# Patient Record
Sex: FEMALE | Race: OTHER | NOT HISPANIC OR LATINO
[De-identification: names, ages, dates, MRNs, and addresses within clinical notes are randomized per-mention and may not be internally consistent; named-entity substitution may affect disease eponyms.]

---
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
## 2022-02-02 ENCOUNTER — APPOINTMENT (OUTPATIENT)
Dept: ENDOCRINOLOGY | Facility: CLINIC | Age: 22
End: 2022-02-02
Payer: COMMERCIAL

## 2022-02-02 VITALS
WEIGHT: 264 LBS | HEIGHT: 66 IN | DIASTOLIC BLOOD PRESSURE: 80 MMHG | BODY MASS INDEX: 42.43 KG/M2 | SYSTOLIC BLOOD PRESSURE: 120 MMHG | TEMPERATURE: 98.6 F | HEART RATE: 65 BPM | OXYGEN SATURATION: 97 %

## 2022-02-02 DIAGNOSIS — R74.01 ELEVATION OF LEVELS OF LIVER TRANSAMINASE LEVELS: ICD-10-CM

## 2022-02-02 DIAGNOSIS — E66.9 OBESITY, UNSPECIFIED: ICD-10-CM

## 2022-02-02 PROCEDURE — 99215 OFFICE O/P EST HI 40 MIN: CPT

## 2022-02-02 RX ORDER — PEN NEEDLE, DIABETIC 29 G X1/2"
32G X 4 MM NEEDLE, DISPOSABLE MISCELLANEOUS
Qty: 100 | Refills: 3 | Status: DISCONTINUED | COMMUNITY
Start: 2021-06-08 | End: 2022-02-02

## 2022-02-02 NOTE — REASON FOR VISIT
[Follow - Up] : a follow-up visit [Weight Management/Obesity] : weight management/obesity [PCOS] : PCOS [Hypothyroidism] : hypothyroidism

## 2022-02-02 NOTE — DATA REVIEWED
[FreeTextEntry1] : Labs\par 12/2/2020\par serum sodium 131\par BUN/cr 5/0.7\par calcium 9.3\par AST 54 ALT 69\par chol 204 tri 228 HDL 31 \par H/H 13.6/40.7\par Hba1c 5.5%\par testo 67 SHBG 8\par insulin level 67.1\par HCG <1\par DHEAS 125\par 24 urine for free cortisol 20.7\par TSH 3.7 \par \par 8/5/21\par glucose 73\par BUN/cr 5/0.66\par calcium 9.4\par AST 53 ALT 95\par chol 226 tri 268 HDL 29 \par H/H 13.8/42.5\par Hba1c 5.7%\par TSH 5.34 Free T4 0.97\par testosterone 50.2\par DHEAS 100\par insulin level 69.3\par \par 1/15/22\par BUN/cr 13/.65\par calcium 9.7\par glucose 84\par AST 30 ALT 63\par chol 184 HDL 32  tri 218\par WBC 11.10\par Hba1c 5.6%\par H/H 13.2/39.7\par TSH 2.9\par Free T4 1.01\par testosterone 32.1\par Tg ab < 20\par DHEA 2.6\par insulin > 300\par

## 2022-02-02 NOTE — HISTORY OF PRESENT ILLNESS
[FreeTextEntry1] : 21 y.o. female with h/o PCOS diagnosed in 2016, obesity and hypothyroidism here for follow up visit. \par \par Originally saw pediatric endocrinology in February 2016 for primary amenorrhea. Laboratory studies showed normal adrenal androgens and high testosterone with low SHBG consistent with PCOS. Patient was treated with courses of Provera and attempted lifestyle changes. Patient was not able to lose weight and started Metformin in February 2017. In October 2019, Metformin was increased to 1,000 mg BID. There was a discussion on weight loss agents such as Saxenda and Orilstat and also bariatric surgery. Patient has declined weight loss medications and bariatric surgery. Patient also was seen by pediatric GI for fatty liver disease. Had fibroscan with significant steatosis but no fibrosis. \par \par Not sexually active and has never seen GYN. Does report h/o acne but now stable. Did see dermatology in the past but not recently. Reports increased facial hair more so under the chin but stable. Reports shaving every 2 weeks. Reports more rapid weight gain in her middle school years. She reports weight gain of 20 pounds since May 2020; however, weight is stable since her last visit.  Has been using Provera every 3 months or so and satisfied with this. Not interested in OCPs because of compliance issue. Taking Metformin ER 2,000 mg daily and more consistent. Never tried Ozempic because decided she did not want any additional medication right now. \par \par Diet: Met with dietician a long time ago\par For breakfast: skips\par For lunch: frozen pizza or chicken nuggets\par For dinner: increased vegetable intake, protein and pasta \par For snacks: ice pops\par Drinks: water, sometimes sweetened iced tea but less and no soda\par \par No exercise. Doing some walking now. She is completed her freshman at [employer] and now transferred to [employer]. Also works at a local bakery 3 days per week. \par \par In regards to hypothyroidism diagnosed in 2021, taking LT4 50 mcg daily and compliant. No new complaints. \par \par In regards to hyperlipidemia, taking Omega 3 fish oil 1 daily.

## 2022-02-02 NOTE — PHYSICAL EXAM
[Alert] : alert [Obese] : obese [No Acute Distress] : no acute distress [Normal Sclera/Conjunctiva] : normal sclera/conjunctiva [EOMI] : extra ocular movement intact [No LAD] : no lymphadenopathy [Thyroid Not Enlarged] : the thyroid was not enlarged [No Thyroid Nodules] : no palpable thyroid nodules [No Respiratory Distress] : no respiratory distress [Clear to Auscultation] : lungs were clear to auscultation bilaterally [Normal S1, S2] : normal S1 and S2 [Regular Rhythm] : with a regular rhythm [No Edema] : no peripheral edema [Normal Bowel Sounds] : normal bowel sounds [Not Tender] : non-tender [Not Distended] : not distended [Soft] : abdomen soft [Normal Anterior Cervical Nodes] : no anterior cervical lymphadenopathy [No Clubbing, Cyanosis] : no clubbing  or cyanosis of the fingernails [No Rash] : no rash [Abdominal Striae] : abdominal striae present [Acanthosis Nigricans] : acanthosis nigricans present [Hirsutism] : hirsutism present [Normal Reflexes] : deep tendon reflexes were 2+ and symmetric [Normal Mood] : the mood was normal [Kyphosis] : no kyphosis present [Acne] : no acne [de-identified] : dorsocervical fat pad noted [de-identified] : flat affect

## 2022-02-02 NOTE — REVIEW OF SYSTEMS
[Fatigue] : fatigue [Irregular Menses] : irregular menses [Acne] : acne [Hirsutism] : hirsutism [Hair Loss] : hair loss [Headaches] : headaches [Depression] : depression [Negative] : Musculoskeletal [Recent Weight Gain (___ Lbs)] : no recent weight gain [Recent Weight Loss (___ Lbs)] : no recent weight loss [Polyuria] : no polyuria [Polydipsia] : no polydipsia [Cold Intolerance] : no cold intolerance [Heat Intolerance] : no heat intolerance [Easy Bruising] : no tendency for easy bruising [Swelling] : no swelling [FreeTextEntry3] : wears glasses for distance

## 2022-02-02 NOTE — ASSESSMENT
[FreeTextEntry1] : 21 y.o. female with h/o PCOS, hyperandrogenism, hyperinsulinism, obesity, hypothyroidism and hyperlipidemia.\par \par 1. PCOS with hyperinsulinism- Discussed pathophysiology and concept of hyperinsulinism. Reviewed increased risk of developing Type 2 DM and now Hba1c is 5.6%. Encouraged lifestyle modification including carbohydrate consistent diet and exercise. Will continue Metformin ER 2,000 mg daily for better compliance. Recommend GYN evaluation now that patient is > 18 years old. Discussed options of OCPs and IUD. For now, will continue course of Provera every 3 months or so. \par \par 2. Hyperandrogenism with hirsutism- Discussed treatment options including Spironolactone and/or OCPs in the past. Patient prefers to monitor for now and does not feel additional treatment is warranted at this time.\par \par 3. Obesity- Screen for Cushings was negative given normal 24 hour urine for cortisol. For now, encouraged diet changes with portion control and exercise. Discussed options of GLP-1 analogs. Reviewed risks and benefits. Also discussed bariatric surgery. Patient prefers to work on lifestyle changes. Discussed the increased risk of developing HTN, Type 2 DM and FOOTE associated with obesity. \par \par 4. Hypothyroidism- Discussed pathophysiology of subclinical hypothyroidism. Given patient's young age, would recommend keeping TSH < 3.0. Will continue Levothyroxine 50 mcg daily. Discussed proper intake of T4. \par \par 5. Hyperlipidemia- Encouraged a low fat diet and exercise. Recommend increasing Omega 3 Fish oil to 2 capsules daily. \par \par Follow up in 4 to 6 months

## 2022-04-11 PROBLEM — Z11.59 SCREENING FOR VIRAL DISEASE: Status: ACTIVE | Noted: 2021-02-10

## 2022-04-18 ENCOUNTER — RX RENEWAL (OUTPATIENT)
Age: 22
End: 2022-04-18

## 2022-07-07 RX ORDER — ALBUTEROL SULFATE 90 UG/1
108 (90 BASE) INHALANT RESPIRATORY (INHALATION)
Qty: 1 | Refills: 0 | Status: ACTIVE | COMMUNITY
Start: 2021-01-04 | End: 1900-01-01

## 2022-08-13 ENCOUNTER — NON-APPOINTMENT (OUTPATIENT)
Age: 22
End: 2022-08-13

## 2022-08-17 ENCOUNTER — APPOINTMENT (OUTPATIENT)
Dept: ENDOCRINOLOGY | Facility: CLINIC | Age: 22
End: 2022-08-17

## 2022-08-17 VITALS
HEIGHT: 66 IN | OXYGEN SATURATION: 97 % | DIASTOLIC BLOOD PRESSURE: 74 MMHG | WEIGHT: 278 LBS | HEART RATE: 80 BPM | BODY MASS INDEX: 44.68 KG/M2 | SYSTOLIC BLOOD PRESSURE: 118 MMHG | TEMPERATURE: 97.6 F

## 2022-08-17 DIAGNOSIS — E16.1 OTHER HYPOGLYCEMIA: ICD-10-CM

## 2022-08-17 PROCEDURE — 99215 OFFICE O/P EST HI 40 MIN: CPT

## 2022-08-17 NOTE — HISTORY OF PRESENT ILLNESS
[FreeTextEntry1] : 22 y.o. female with h/o PCOS diagnosed in 2016, obesity and hypothyroidism here for follow up visit. \par \par Originally saw pediatric endocrinology in February 2016 for primary amenorrhea. Laboratory studies showed normal adrenal androgens and high testosterone with low SHBG consistent with PCOS. Patient was treated with courses of Provera and attempted lifestyle changes. Patient was not able to lose weight and started Metformin in February 2017. In October 2019, Metformin was increased to 1,000 mg BID. There was a discussion on weight loss agents such as Saxenda and Orilstat and also bariatric surgery. Patient has declined weight loss medications and bariatric surgery. Patient also was seen by pediatric GI for fatty liver disease. Had fibroscan with significant steatosis but no fibrosis. \par \par Not sexually active and has never seen GYN. Does report h/o acne but now stable. Did see dermatology in the past but not recently. Reports increased facial hair more so under the chin but stable. Reports shaving every 2 weeks. Reports more rapid weight gain in her middle school years. She reports weight gain of 20 pounds since May 2020; however, she reports more weight gain since her last visit.  Has been using Provera every 3 months or so and satisfied with this. Not interested in OCPs because of compliance issue. Taking Metformin ER 2,000 mg daily but not very consistent and only taking once weekly. Never tried Ozempic because decided she did not want any additional medication right now. \par \par Diet: Met with dietician a long time ago\par For breakfast: skips\par For lunch: frozen pizza or chicken nuggets\par For dinner: less vegetable intake, protein and pasta \par For snacks: ice pops\par Drinks: water\par \par No exercise. Doing some walking and swimming now. She is completed her freshman year at [employer] and now transferred to [employer] (will start carl year). Also works at a local bakery 3 days per week. \par \par In regards to hypothyroidism diagnosed in 2021, taking LT4 50 mcg daily but noncompliant. No new complaints. \par \par In regards to hyperlipidemia, taking Omega 3 fish oil 2 daily but not consistent.

## 2022-08-17 NOTE — DATA REVIEWED
[FreeTextEntry1] : Labs\par 12/2/2020\par serum sodium 131\par BUN/cr 5/0.7\par calcium 9.3\par AST 54 ALT 69\par chol 204 tri 228 HDL 31 \par H/H 13.6/40.7\par Hba1c 5.5%\par testo 67 SHBG 8\par insulin level 67.1\par HCG <1\par DHEAS 125\par 24 urine for free cortisol 20.7\par TSH 3.7 \par \par 8/5/21\par glucose 73\par BUN/cr 5/0.66\par calcium 9.4\par AST 53 ALT 95\par chol 226 tri 268 HDL 29 \par H/H 13.8/42.5\par Hba1c 5.7%\par TSH 5.34 Free T4 0.97\par testosterone 50.2\par DHEAS 100\par insulin level 69.3\par \par 1/15/22\par BUN/cr 13/.65\par calcium 9.7\par glucose 84\par AST 30 ALT 63\par chol 184 HDL 32  tri 218\par WBC 11.10\par Hba1c 5.6%\par H/H 13.2/39.7\par TSH 2.9\par Free T4 1.01\par testosterone 32.1\par Tg ab < 20\par DHEA 2.6\par insulin > 300\par \par \par 8/16/22\par ALT 49\par glucose 74\par BUN/cr 9/0.56\par chol 170 tri 225 HDL 30 LDL 93\par H/H 12.8/38.2\par Hba1c 5.6%\par TSH 2.53\par Free T4 0.9\par LH 3.0 FSH 1.0\par prolactin 20.1\par estradiol 42\par folic acid 3.64 vitamin B12 319\par IGF-1 144\par testosterone 68

## 2022-08-17 NOTE — PHYSICAL EXAM
[Alert] : alert [Obese] : obese [No Acute Distress] : no acute distress [Normal Sclera/Conjunctiva] : normal sclera/conjunctiva [EOMI] : extra ocular movement intact [No LAD] : no lymphadenopathy [Thyroid Not Enlarged] : the thyroid was not enlarged [No Thyroid Nodules] : no palpable thyroid nodules [No Respiratory Distress] : no respiratory distress [Clear to Auscultation] : lungs were clear to auscultation bilaterally [Normal S1, S2] : normal S1 and S2 [Regular Rhythm] : with a regular rhythm [No Edema] : no peripheral edema [Normal Bowel Sounds] : normal bowel sounds [Not Tender] : non-tender [Not Distended] : not distended [Soft] : abdomen soft [Normal Anterior Cervical Nodes] : no anterior cervical lymphadenopathy [No Clubbing, Cyanosis] : no clubbing  or cyanosis of the fingernails [No Rash] : no rash [Abdominal Striae] : abdominal striae present [Acanthosis Nigricans] : acanthosis nigricans present [Hirsutism] : hirsutism present [Normal Reflexes] : deep tendon reflexes were 2+ and symmetric [Normal Mood] : the mood was normal [Kyphosis] : no kyphosis present [Acne] : no acne [de-identified] : dorsocervical fat pad noted [de-identified] : flat affect

## 2022-08-17 NOTE — REVIEW OF SYSTEMS
[Fatigue] : fatigue [Recent Weight Gain (___ Lbs)] : recent weight gain: [unfilled] lbs [Irregular Menses] : irregular menses [Acne] : acne [Hirsutism] : hirsutism [Hair Loss] : hair loss [Headaches] : headaches [Depression] : depression [Negative] : Musculoskeletal [Recent Weight Loss (___ Lbs)] : no recent weight loss [Polyuria] : no polyuria [Polydipsia] : no polydipsia [Cold Intolerance] : no cold intolerance [Heat Intolerance] : no heat intolerance [Easy Bruising] : no tendency for easy bruising [Swelling] : no swelling [FreeTextEntry3] : wears glasses for distance

## 2022-08-17 NOTE — ASSESSMENT
[FreeTextEntry1] : 22 y.o. female with h/o PCOS, hyperandrogenism, hyperinsulinism, obesity, hypothyroidism and hyperlipidemia.\par \par 1. PCOS with hyperinsulinism- Discussed pathophysiology and concept of hyperinsulinism. Reviewed increased risk of developing Type 2 DM and now Hba1c is 5.6%. Encouraged lifestyle modification including a carbohydrate consistent diet and exercise. Will continue Metformin ER 2,000 mg daily for better compliance and recommend taking it daily. Recommend GYN evaluation now that patient is > 18 years old. Discussed options of OCPs and IUD. For now, will continue course of Provera every 3 months or so. \par \par 2. Hyperandrogenism with hirsutism- Discussed treatment options including Spironolactone and/or OCPs in the past. Patient prefers to monitor for now and does not feel additional treatment is warranted at this time.\par \par 3. Obesity- Screen for Cushings was negative given normal 24 hour urine for cortisol. For now, encouraged diet changes with portion control and exercise. Discussed options of GLP-1 analogs. Reviewed risks and benefits. Also discussed bariatric surgery. Patient prefers to work on lifestyle changes. Discussed the increased risk of developing HTN, Type 2 DM and FOOTE associated with obesity. \par \par 4. Hypothyroidism- Discussed pathophysiology of subclinical hypothyroidism. Given patient's young age, would recommend keeping TSH < 3.0. Will continue Levothyroxine 50 mcg daily. Discussed proper intake of T4. \par \par 5. Hyperlipidemia- Encouraged a low fat diet and exercise. Recommend increasing Omega 3 Fish oil to 2 capsules daily. \par \par 6. Recommend adding Folic acid 400 mg daily. \par \par Follow up in 4 to 6 months

## 2022-09-19 ENCOUNTER — APPOINTMENT (OUTPATIENT)
Dept: FAMILY MEDICINE | Facility: CLINIC | Age: 22
End: 2022-09-19

## 2022-09-19 VITALS
WEIGHT: 280.6 LBS | RESPIRATION RATE: 16 BRPM | HEIGHT: 66 IN | DIASTOLIC BLOOD PRESSURE: 68 MMHG | HEART RATE: 99 BPM | OXYGEN SATURATION: 97 % | SYSTOLIC BLOOD PRESSURE: 102 MMHG | BODY MASS INDEX: 45.1 KG/M2 | TEMPERATURE: 98.4 F

## 2022-09-19 DIAGNOSIS — K76.0 FATTY (CHANGE OF) LIVER, NOT ELSEWHERE CLASSIFIED: ICD-10-CM

## 2022-09-19 DIAGNOSIS — Z23 ENCOUNTER FOR IMMUNIZATION: ICD-10-CM

## 2022-09-19 DIAGNOSIS — E66.01 MORBID (SEVERE) OBESITY DUE TO EXCESS CALORIES: ICD-10-CM

## 2022-09-19 DIAGNOSIS — Z00.00 ENCOUNTER FOR GENERAL ADULT MEDICAL EXAMINATION W/OUT ABNORMAL FINDINGS: ICD-10-CM

## 2022-09-19 PROCEDURE — 90686 IIV4 VACC NO PRSV 0.5 ML IM: CPT

## 2022-09-19 PROCEDURE — G0008: CPT

## 2022-09-19 PROCEDURE — 99395 PREV VISIT EST AGE 18-39: CPT | Mod: 25

## 2022-09-19 PROCEDURE — G0447 BEHAVIOR COUNSEL OBESITY 15M: CPT

## 2022-09-19 RX ORDER — LISDEXAMFETAMINE DIMESYLATE 40 MG/1
40 CAPSULE ORAL
Qty: 30 | Refills: 0 | Status: COMPLETED | COMMUNITY
End: 2022-09-19

## 2022-09-19 RX ORDER — SERTRALINE HYDROCHLORIDE 100 MG/1
100 TABLET, FILM COATED ORAL
Qty: 90 | Refills: 1 | Status: COMPLETED | COMMUNITY
Start: 2021-08-09 | End: 2022-09-19

## 2022-09-19 NOTE — HISTORY OF PRESENT ILLNESS
[de-identified] : 23 y/o F with multiple medical issues, HLD, Obesity,Hypothyroid, Hyperandrogenism, NAFLD , PCOS  presents for routine PE. Not sexually active. HAs 1  Covid shot 1 Booster/.

## 2023-01-16 ENCOUNTER — APPOINTMENT (OUTPATIENT)
Dept: FAMILY MEDICINE | Facility: CLINIC | Age: 23
End: 2023-01-16
Payer: COMMERCIAL

## 2023-01-16 DIAGNOSIS — J02.9 ACUTE PHARYNGITIS, UNSPECIFIED: ICD-10-CM

## 2023-01-16 DIAGNOSIS — R09.81 NASAL CONGESTION: ICD-10-CM

## 2023-01-16 PROCEDURE — 99214 OFFICE O/P EST MOD 30 MIN: CPT | Mod: 95

## 2023-01-16 NOTE — REVIEW OF SYSTEMS
[Fatigue] : fatigue [Nasal Discharge] : nasal discharge [Sore Throat] : sore throat [Postnasal Drip] : postnasal drip [Cough] : cough [Muscle Pain] : muscle pain [Negative] : Cardiovascular

## 2023-01-18 ENCOUNTER — APPOINTMENT (OUTPATIENT)
Dept: FAMILY MEDICINE | Facility: CLINIC | Age: 23
End: 2023-01-18
Payer: COMMERCIAL

## 2023-01-18 ENCOUNTER — NON-APPOINTMENT (OUTPATIENT)
Age: 23
End: 2023-01-18

## 2023-01-18 ENCOUNTER — APPOINTMENT (OUTPATIENT)
Dept: FAMILY MEDICINE | Facility: CLINIC | Age: 23
End: 2023-01-18

## 2023-01-18 DIAGNOSIS — U07.1 COVID-19: ICD-10-CM

## 2023-01-18 PROCEDURE — 99442: CPT

## 2023-01-18 NOTE — REVIEW OF SYSTEMS
[Fatigue] : fatigue [Cough] : cough [Negative] : Cardiovascular [Shortness Of Breath] : no shortness of breath [Wheezing] : no wheezing [Dyspnea on Exertion] : no dyspnea on exertion

## 2023-01-18 NOTE — HISTORY OF PRESENT ILLNESS
[Home] : at home, [unfilled] , at the time of the visit. [Medical Office: ([facility])___] : at the medical office located in  [Mother] : mother [FreeTextEntry3] : Mother [FreeTextEntry4] : Lenin Mcduffie [FreeTextEntry1] : 21 yo female with PMHx asthma (albuterol) presents for a follow up on COVID. [de-identified] : The patient's mother is present on behalf of the patient (patient is speaking to her mother during the visit, but does not want to speak on the phone) mother states that her daughter "swallowed a lot of mucous last night and wants to make sure it's not stuck in her chest" states that patient reports "my breathing is fine, I'm tired" denies any chest pain, shortness of breath. using ventolin prn, was given OTC mucinex last night which helped symptoms.

## 2023-02-08 ENCOUNTER — APPOINTMENT (OUTPATIENT)
Dept: ENDOCRINOLOGY | Facility: CLINIC | Age: 23
End: 2023-02-08
Payer: COMMERCIAL

## 2023-02-08 VITALS
OXYGEN SATURATION: 96 % | DIASTOLIC BLOOD PRESSURE: 90 MMHG | SYSTOLIC BLOOD PRESSURE: 140 MMHG | HEART RATE: 108 BPM | BODY MASS INDEX: 44.84 KG/M2 | HEIGHT: 66 IN | WEIGHT: 279 LBS

## 2023-02-08 PROCEDURE — 99215 OFFICE O/P EST HI 40 MIN: CPT

## 2023-02-08 NOTE — HISTORY OF PRESENT ILLNESS
[FreeTextEntry1] : 22 y.o. female with h/o PCOS diagnosed in 2016, obesity and hypothyroidism here for follow up visit. \par \par Originally saw pediatric endocrinology in February 2016 for primary amenorrhea. Laboratory studies showed normal adrenal androgens and high testosterone with low SHBG consistent with PCOS. Patient was treated with courses of Provera and attempted lifestyle changes. Patient was not able to lose weight and started Metformin in February 2017. In October 2019, Metformin was increased to 1,000 mg BID. There was a discussion on weight loss agents such as Saxenda and Orlistat and also bariatric surgery. Patient has declined weight loss medications and bariatric surgery. Patient also was seen by pediatric GI for fatty liver disease. Had fibroscan with significant steatosis but no fibrosis. \par \par Not sexually active and has never seen GYN. Does report h/o acne but now stable. Did see dermatology in the past but not recently. Reports increased facial hair more so under the chin but stable. Reports shaving every 2 weeks. Reports more rapid weight gain in her middle school years. She reports weight gain of 20 pounds since May 2020; however, she reports weight is stable since her last visit.  Has been using Provera every 3 months or so and satisfied with this. Not interested in OCPs because of compliance issue. Taking Metformin ER 2,000 mg daily but not very consistent. Never tried Ozempic because decided she did not want any additional medication right now. \par \par Diet: Met with dietician a long time ago\par For breakfast: skips\par For lunch: frozen pizza or chicken nuggets\par For dinner: some vegetable intake, protein and pasta \par For snacks: ice pops, bananas, chips or pretzels\par Drinks: water\par \par No exercise. Doing some walking. She is completed her freshman year at [employer] and now transferred to [employer]. Now taking a semester off.  Also works at a local bakery 3 days per week. \par \par In regards to hypothyroidism diagnosed in 2021, taking LT4 50 mcg daily but noncompliant. No new complaints. \par \par In regards to hyperlipidemia, taking Omega 3 fish oil 2 daily but not consistent.

## 2023-02-08 NOTE — PHYSICAL EXAM
[Alert] : alert [Obese] : obese [No Acute Distress] : no acute distress [Normal Sclera/Conjunctiva] : normal sclera/conjunctiva [EOMI] : extra ocular movement intact [No LAD] : no lymphadenopathy [Thyroid Not Enlarged] : the thyroid was not enlarged [No Thyroid Nodules] : no palpable thyroid nodules [No Respiratory Distress] : no respiratory distress [Clear to Auscultation] : lungs were clear to auscultation bilaterally [Normal S1, S2] : normal S1 and S2 [Regular Rhythm] : with a regular rhythm [No Edema] : no peripheral edema [Normal Bowel Sounds] : normal bowel sounds [Not Tender] : non-tender [Not Distended] : not distended [Soft] : abdomen soft [Normal Anterior Cervical Nodes] : no anterior cervical lymphadenopathy [No Clubbing, Cyanosis] : no clubbing  or cyanosis of the fingernails [No Rash] : no rash [Abdominal Striae] : abdominal striae present [Acanthosis Nigricans] : acanthosis nigricans present [Hirsutism] : hirsutism present [Normal Reflexes] : deep tendon reflexes were 2+ and symmetric [Normal Mood] : the mood was normal [Kyphosis] : no kyphosis present [Acne] : no acne [de-identified] : dorsocervical fat pad noted [de-identified] : flat affect

## 2023-02-08 NOTE — ASSESSMENT
[FreeTextEntry1] : 22 y.o. female with h/o PCOS, hyperandrogenism, hyperinsulinism, obesity, hypothyroidism and hyperlipidemia.\par \par 1. PCOS with hyperinsulinism- Discussed pathophysiology and concept of hyperinsulinism. Reviewed increased risk of developing Type 2 DM and now Hba1c is 5.6%. Encouraged lifestyle modification including a carbohydrate consistent diet and exercise. Will continue Metformin ER 2,000 mg daily for better compliance and recommend taking it daily. Recommend GYN evaluation now that patient is > 18 years old. Discussed options of OCPs and IUD. For now, will continue course of Provera every 3 months or so. Will check testosterone and DHEAS levels. \par \par 2. Hyperandrogenism with hirsutism- Discussed treatment options including Spironolactone and/or OCPs in the past. Patient prefers to monitor for now and does not feel additional treatment is warranted at this time.\par \par 3. Obesity- Screen for Cushings was negative given normal 24 hour urine for cortisol. For now, encouraged diet changes with portion control and exercise. Discussed options of GLP-1 agonists such as Wegovy. Reviewed risks and benefits. Also discussed bariatric surgery. Patient prefers to work on lifestyle changes. Discussed the increased risk of developing HTN, Type 2 DM and FOOTE associated with obesity. Will check HBa1c.\par \par 4. Hypothyroidism- Discussed pathophysiology of subclinical hypothyroidism. Given patient's young age, would recommend keeping TSH < 3.0. Will continue Levothyroxine 50 mcg daily and stressed compliance. Discussed proper intake of T4. Will check TFTs.\par \par 5. Hyperlipidemia- Encouraged a low fat diet and exercise. Recommend taking Omega 3 Fish oil  2 capsules daily. Will check lipids. \par \par 6. Will check vitamin B12 and folate\par \par Follow up in 4 to 6 months

## 2023-02-10 LAB
25(OH)D3 SERPL-MCNC: 18.6 NG/ML
ALBUMIN SERPL ELPH-MCNC: 4.5 G/DL
ALP BLD-CCNC: 78 U/L
ALT SERPL-CCNC: 67 U/L
ANION GAP SERPL CALC-SCNC: 10 MMOL/L
AST SERPL-CCNC: 35 U/L
BASOPHILS # BLD AUTO: 0.04 K/UL
BASOPHILS NFR BLD AUTO: 0.5 %
BILIRUB SERPL-MCNC: 0.2 MG/DL
BUN SERPL-MCNC: 7 MG/DL
CALCIUM SERPL-MCNC: 10 MG/DL
CHLORIDE SERPL-SCNC: 103 MMOL/L
CHOLEST SERPL-MCNC: 185 MG/DL
CO2 SERPL-SCNC: 24 MMOL/L
CREAT SERPL-MCNC: 0.69 MG/DL
DHEA-S SERPL-MCNC: 148 UG/DL
EGFR: 127 ML/MIN/1.73M2
EOSINOPHIL # BLD AUTO: 0.08 K/UL
EOSINOPHIL NFR BLD AUTO: 0.9 %
ESTIMATED AVERAGE GLUCOSE: 114 MG/DL
ESTRADIOL SERPL-MCNC: 48 PG/ML
FOLATE SERPL-MCNC: 5.1 NG/ML
FSH SERPL-MCNC: 2 IU/L
GLUCOSE SERPL-MCNC: 84 MG/DL
HBA1C MFR BLD HPLC: 5.6 %
HCT VFR BLD CALC: 42.6 %
HDLC SERPL-MCNC: 35 MG/DL
HGB BLD-MCNC: 13.9 G/DL
IMM GRANULOCYTES NFR BLD AUTO: 0.3 %
LDLC SERPL CALC-MCNC: 117 MG/DL
LH SERPL-ACNC: 5.8 IU/L
LYMPHOCYTES # BLD AUTO: 2.25 K/UL
LYMPHOCYTES NFR BLD AUTO: 25.5 %
MAN DIFF?: NORMAL
MCHC RBC-ENTMCNC: 29.4 PG
MCHC RBC-ENTMCNC: 32.6 GM/DL
MCV RBC AUTO: 90.1 FL
MONOCYTES # BLD AUTO: 0.39 K/UL
MONOCYTES NFR BLD AUTO: 4.4 %
NEUTROPHILS # BLD AUTO: 6.02 K/UL
NEUTROPHILS NFR BLD AUTO: 68.4 %
NONHDLC SERPL-MCNC: 150 MG/DL
PLATELET # BLD AUTO: 392 K/UL
POTASSIUM SERPL-SCNC: 4.5 MMOL/L
PROT SERPL-MCNC: 7.5 G/DL
RBC # BLD: 4.73 M/UL
RBC # FLD: 13.3 %
SODIUM SERPL-SCNC: 138 MMOL/L
T4 FREE SERPL-MCNC: 1.1 NG/DL
TESTOST SERPL-MCNC: 53.5 NG/DL
TRIGL SERPL-MCNC: 168 MG/DL
TSH SERPL-ACNC: 5.73 UIU/ML
VIT B12 SERPL-MCNC: 507 PG/ML
WBC # FLD AUTO: 8.81 K/UL

## 2023-08-09 ENCOUNTER — APPOINTMENT (OUTPATIENT)
Dept: ENDOCRINOLOGY | Facility: CLINIC | Age: 23
End: 2023-08-09
Payer: COMMERCIAL

## 2023-08-09 ENCOUNTER — NON-APPOINTMENT (OUTPATIENT)
Age: 23
End: 2023-08-09

## 2023-08-09 VITALS
BODY MASS INDEX: 45.8 KG/M2 | DIASTOLIC BLOOD PRESSURE: 70 MMHG | HEIGHT: 66 IN | OXYGEN SATURATION: 98 % | HEART RATE: 78 BPM | WEIGHT: 285 LBS | SYSTOLIC BLOOD PRESSURE: 118 MMHG

## 2023-08-09 DIAGNOSIS — E03.9 HYPOTHYROIDISM, UNSPECIFIED: ICD-10-CM

## 2023-08-09 PROCEDURE — 99215 OFFICE O/P EST HI 40 MIN: CPT

## 2023-08-09 NOTE — PHYSICAL EXAM
[Alert] : alert [Obese] : obese [No Acute Distress] : no acute distress [Normal Sclera/Conjunctiva] : normal sclera/conjunctiva [EOMI] : extra ocular movement intact [No LAD] : no lymphadenopathy [Thyroid Not Enlarged] : the thyroid was not enlarged [No Thyroid Nodules] : no palpable thyroid nodules [No Respiratory Distress] : no respiratory distress [Clear to Auscultation] : lungs were clear to auscultation bilaterally [Normal S1, S2] : normal S1 and S2 [Regular Rhythm] : with a regular rhythm [No Edema] : no peripheral edema [Normal Bowel Sounds] : normal bowel sounds [Not Tender] : non-tender [Not Distended] : not distended [Soft] : abdomen soft [Normal Anterior Cervical Nodes] : no anterior cervical lymphadenopathy [No Clubbing, Cyanosis] : no clubbing  or cyanosis of the fingernails [Abdominal Striae] : abdominal striae present [Acanthosis Nigricans] : acanthosis nigricans present [Hirsutism] : hirsutism present [Normal Reflexes] : deep tendon reflexes were 2+ and symmetric [Normal Mood] : the mood was normal [Kyphosis] : no kyphosis present [Acne] : no acne [de-identified] : dorsocervical fat pad noted [de-identified] : flat affect

## 2023-08-09 NOTE — HISTORY OF PRESENT ILLNESS
[FreeTextEntry1] : 23 y.o. female with h/o PCOS diagnosed in 2016, obesity and hypothyroidism here for follow up visit.   Working with a therapist once weekly.   Originally saw pediatric endocrinology in February 2016 for primary amenorrhea. Laboratory studies showed normal adrenal androgens and high testosterone with low SHBG consistent with PCOS. Patient was treated with courses of Provera and attempted lifestyle changes. Patient was not able to lose weight and started Metformin in February 2017. In October 2019, Metformin was increased to 1,000 mg BID. There was a discussion on weight loss agents such as Saxenda and Orlistat and also bariatric surgery. Patient has declined weight loss medications and bariatric surgery. Patient also was seen by pediatric GI for fatty liver disease. Had fibroscan with significant steatosis but no fibrosis.   Not sexually active and has never seen GYN. Does report h/o acne but now stable. Did see dermatology in the past but not recently. Reports increased facial hair more so under the chin but stable. Reports shaving every 2 weeks. Reports more rapid weight gain in her middle school years. She reports weight gain of 20 pounds since May 2020; however, she reports weight is fairly stable since her last visit.  Has been using Provera every 3 months or so and satisfied with this. Not interested in OCPs because of compliance issue. Taking Metformin ER 2,000 mg daily but not very consistent. Never tried Ozempic because decided she did not want any additional medication right now.   Diet: Met with dietician a long time ago For breakfast: skips For lunch: frozen pizza or chicken nuggets For dinner: some vegetable intake, protein and starch For snacks: less Drinks: water  No exercise. Doing some walking. She is completed her freshman year at [employer] and transferred to [employer] but sure of plans for the fall.  Also works at a local [employer] 3 days per week.   In regard to hypothyroidism diagnosed in 2021, taking LT4 50 mcg daily but noncompliant. No new complaints.   In regards to hyperlipidemia, taking Omega 3 fish oil 2 daily but not consistent.

## 2023-08-09 NOTE — ASSESSMENT
[FreeTextEntry1] : 23 y.o. female with h/o PCOS, hyperandrogenism, hyperinsulinism, obesity, hypothyroidism and hyperlipidemia.  1. PCOS with hyperinsulinism- Discussed pathophysiology and concept of hyperinsulinism. Reviewed increased risk of developing Type 2 DM. Will check CMP and Hba1c. Encouraged lifestyle modification including a carbohydrate consistent diet and exercise. Will continue Metformin ER 2,000 mg daily for better compliance and recommend taking it daily. Recommend GYN evaluation now that patient is > 18 years old. Discussed options of OCPs and IUD. For now, will continue course of Provera every 3 months or so. Will check testosterone and DHEAS levels.   2. Hyperandrogenism with hirsutism- Discussed treatment options including Spironolactone and/or OCPs in the past. Patient prefers to monitor for now and does not feel additional treatment is warranted at this time.  3. Obesity- Screen for Cushings was negative given normal 24-hour urine for cortisol. For now, encouraged diet changes with portion control and exercise. Discussed options of GLP-1 agonists such as Wegovy. Reviewed risks and benefits. Also discussed bariatric surgery. Patient prefers to work on lifestyle changes. Will refer to medical weight management program. Discussed the increased risk of developing HTN, Type 2 DM and FOOTE associated with obesity. Will check HBa1c.  4. Hypothyroidism- Discussed pathophysiology of subclinical hypothyroidism. Given patient's young age, would recommend keeping TSH < 3.0. Will continue Levothyroxine 50 mcg daily and stressed compliance. Discussed proper intake of T4. Will check TFTs.  5. Hyperlipidemia- Encouraged a low-fat diet and exercise. Recommend taking Omega 3 Fish oil 2 capsules daily. Will check lipids.   6. Will check vitamin B12 and folate  Follow up in 4 to 6 months.

## 2023-08-10 ENCOUNTER — APPOINTMENT (OUTPATIENT)
Dept: FAMILY MEDICINE | Facility: CLINIC | Age: 23
End: 2023-08-10
Payer: COMMERCIAL

## 2023-08-10 VITALS
HEIGHT: 66 IN | DIASTOLIC BLOOD PRESSURE: 80 MMHG | TEMPERATURE: 98.6 F | WEIGHT: 285 LBS | SYSTOLIC BLOOD PRESSURE: 124 MMHG | RESPIRATION RATE: 16 BRPM | HEART RATE: 79 BPM | BODY MASS INDEX: 45.8 KG/M2 | OXYGEN SATURATION: 98 %

## 2023-08-10 DIAGNOSIS — R10.33 PERIUMBILICAL PAIN: ICD-10-CM

## 2023-08-10 DIAGNOSIS — F32.A ANXIETY DISORDER, UNSPECIFIED: ICD-10-CM

## 2023-08-10 DIAGNOSIS — F41.9 ANXIETY DISORDER, UNSPECIFIED: ICD-10-CM

## 2023-08-10 DIAGNOSIS — R50.9 FEVER, UNSPECIFIED: ICD-10-CM

## 2023-08-10 PROCEDURE — 99214 OFFICE O/P EST MOD 30 MIN: CPT

## 2023-08-10 NOTE — HISTORY OF PRESENT ILLNESS
[FreeTextEntry8] : 24 y/o F PMHx insulin resistance (Metformin), hypothyroid (Levothyroxine) presents. With not feeling well for 1 week. Admits to low grade temp . Tmax 101  now <100. Decreased appetite, nausea no vomiting or diarrhea. ST when talking. Abd pain described 3/10. diffuse. No travel. Home Covid test NEG.

## 2023-08-10 NOTE — REVIEW OF SYSTEMS
[Fever] : fever [Chills] : chills [Sore Throat] : sore throat [Cough] : cough [Abdominal Pain] : abdominal pain [Nausea] : nausea [Negative] : Heme/Lymph [FreeTextEntry7] : dc appetite

## 2023-08-25 ENCOUNTER — RESULT REVIEW (OUTPATIENT)
Age: 23
End: 2023-08-25

## 2023-08-26 ENCOUNTER — APPOINTMENT (OUTPATIENT)
Dept: CT IMAGING | Facility: CLINIC | Age: 23
End: 2023-08-26
Payer: COMMERCIAL

## 2023-08-26 ENCOUNTER — OUTPATIENT (OUTPATIENT)
Dept: OUTPATIENT SERVICES | Facility: HOSPITAL | Age: 23
LOS: 1 days | End: 2023-08-26
Payer: COMMERCIAL

## 2023-08-26 DIAGNOSIS — R10.33 PERIUMBILICAL PAIN: ICD-10-CM

## 2023-08-26 PROCEDURE — 74176 CT ABD & PELVIS W/O CONTRAST: CPT

## 2023-08-26 PROCEDURE — 74176 CT ABD & PELVIS W/O CONTRAST: CPT | Mod: 26

## 2023-08-30 LAB
25(OH)D3 SERPL-MCNC: 16.2 NG/ML
ALBUMIN SERPL ELPH-MCNC: 4.6 G/DL
ALP BLD-CCNC: 75 U/L
ALT SERPL-CCNC: 56 U/L
ANION GAP SERPL CALC-SCNC: 12 MMOL/L
AST SERPL-CCNC: 39 U/L
BILIRUB SERPL-MCNC: 0.2 MG/DL
BUN SERPL-MCNC: 10 MG/DL
CALCIUM SERPL-MCNC: 9.8 MG/DL
CHLORIDE SERPL-SCNC: 100 MMOL/L
CHOLEST SERPL-MCNC: 187 MG/DL
CO2 SERPL-SCNC: 25 MMOL/L
CREAT SERPL-MCNC: 0.65 MG/DL
DHEA-S SERPL-MCNC: 132 UG/DL
EGFR: 128 ML/MIN/1.73M2
ESTIMATED AVERAGE GLUCOSE: 111 MG/DL
ESTRADIOL SERPL-MCNC: 55 PG/ML
FOLATE SERPL-MCNC: 4.6 NG/ML
FSH SERPL-MCNC: 1.8 IU/L
GLUCOSE SERPL-MCNC: 80 MG/DL
HBA1C MFR BLD HPLC: 5.5 %
HDLC SERPL-MCNC: 36 MG/DL
INSULIN P FAST SERPL-ACNC: 115 UU/ML
LDLC SERPL CALC-MCNC: 114 MG/DL
LH SERPL-ACNC: 3.3 IU/L
NONHDLC SERPL-MCNC: 151 MG/DL
POTASSIUM SERPL-SCNC: 4.7 MMOL/L
PROT SERPL-MCNC: 7.6 G/DL
SODIUM SERPL-SCNC: 138 MMOL/L
T4 FREE SERPL-MCNC: 0.9 NG/DL
TESTOST SERPL-MCNC: 68.1 NG/DL
TRIGL SERPL-MCNC: 208 MG/DL
TSH SERPL-ACNC: 4.56 UIU/ML
VIT B12 SERPL-MCNC: 439 PG/ML

## 2023-10-04 LAB — SARS-COV-2 N GENE NPH QL NAA+PROBE: NOT DETECTED

## 2024-02-07 ENCOUNTER — APPOINTMENT (OUTPATIENT)
Dept: ENDOCRINOLOGY | Facility: CLINIC | Age: 24
End: 2024-02-07
Payer: COMMERCIAL

## 2024-02-07 VITALS
OXYGEN SATURATION: 98 % | HEIGHT: 66 IN | HEART RATE: 76 BPM | DIASTOLIC BLOOD PRESSURE: 70 MMHG | SYSTOLIC BLOOD PRESSURE: 130 MMHG | WEIGHT: 285 LBS | BODY MASS INDEX: 45.8 KG/M2

## 2024-02-07 DIAGNOSIS — E28.2 POLYCYSTIC OVARIAN SYNDROME: ICD-10-CM

## 2024-02-07 DIAGNOSIS — E66.01 MORBID (SEVERE) OBESITY DUE TO EXCESS CALORIES: ICD-10-CM

## 2024-02-07 DIAGNOSIS — R73.03 PREDIABETES.: ICD-10-CM

## 2024-02-07 DIAGNOSIS — E78.5 HYPERLIPIDEMIA, UNSPECIFIED: ICD-10-CM

## 2024-02-07 DIAGNOSIS — E28.8 OTHER OVARIAN DYSFUNCTION: ICD-10-CM

## 2024-02-07 PROCEDURE — 99215 OFFICE O/P EST HI 40 MIN: CPT

## 2024-02-07 RX ORDER — METFORMIN ER 500 MG 500 MG/1
500 TABLET ORAL
Qty: 120 | Refills: 5 | Status: ACTIVE | COMMUNITY
Start: 2017-02-10 | End: 1900-01-01

## 2024-02-07 RX ORDER — LEVOTHYROXINE SODIUM 0.05 MG/1
50 TABLET ORAL DAILY
Qty: 30 | Refills: 5 | Status: ACTIVE | COMMUNITY
Start: 2021-08-30 | End: 1900-01-01

## 2024-02-07 NOTE — DATA REVIEWED
[FreeTextEntry1] : Labs 12/2/2020 serum sodium 131 BUN/cr 5/0.7 calcium 9.3 AST 54 ALT 69 chol 204 tri 228 HDL 31  H/H 13.6/40.7 Hba1c 5.5% testo 67 SHBG 8 insulin level 67.1 HCG <1 DHEAS 125 24 urine for free cortisol 20.7 TSH 3.7   8/5/21 glucose 73 BUN/cr 5/0.66 calcium 9.4 AST 53 ALT 95 chol 226 tri 268 HDL 29  H/H 13.8/42.5 Hba1c 5.7% TSH 5.34 Free T4 0.97 testosterone 50.2 DHEAS 100 insulin level 69.3  1/15/22 BUN/cr 13/.65 calcium 9.7 glucose 84 AST 30 ALT 63 chol 184 HDL 32  tri 218 WBC 11.10 Hba1c 5.6% H/H 13.2/39.7 TSH 2.9 Free T4 1.01 testosterone 32.1 Tg ab < 20 DHEA 2.6 insulin > 300   8/16/22 ALT 49 glucose 74 BUN/cr 9/0.56 chol 170 tri 225 HDL 30 LDL 93 H/H 12.8/38.2 Hba1c 5.6% TSH 2.53 Free T4 0.9 LH 3.0 FSH 1.0 prolactin 20.1 estradiol 42 folic acid 3.64 vitamin B12 319 IGF-1 144 testosterone 68  1/31/24 glucose 77 BUN/cr 12/.78 calcium 9.3 AST 29 ALT 44 chol 190 HDL 28  tri 199 H/H 13.6/41.0 Hba1c 5.6% TSH 4.82 Free T4 1.06 testosterone 49.8 folic acid 6.4 vitamin B12 495 DHEA 6.6 25 vitamin D 16.4

## 2024-02-07 NOTE — HISTORY OF PRESENT ILLNESS
[FreeTextEntry1] : 23 y.o. female with h/o PCOS diagnosed in 2016, obesity and hypothyroidism here for follow up visit.   Working with a therapist every 2 to 3 weeks.   Originally saw pediatric endocrinology in February 2016 for primary amenorrhea. Laboratory studies showed normal adrenal androgens and high testosterone with low SHBG consistent with PCOS. Patient was treated with courses of Provera and attempted lifestyle changes. Patient was not able to lose weight and started Metformin in February 2017. In October 2019, Metformin was increased to 1,000 mg BID. There was a discussion on weight loss agents such as Saxenda and Orlistat and also bariatric surgery. Patient has declined weight loss medications and bariatric surgery. Patient also was seen by pediatric GI for fatty liver disease. Had fibroscan with significant steatosis but no fibrosis.   Not sexually active and has never seen GYN. Does report h/o acne but now stable. Did see dermatology in the past but not recently. Reports increased facial hair more so under the chin but stable. Reports shaving every 2 weeks. Reports more rapid weight gain in her middle school years. She reports weight gain of 20 pounds since May 2020; however, she reports weight is fairly stable since her last visit.  Has been using Provera every 3 months or so and satisfied with this. Not interested in OCPs because of compliance issue. Taking Metformin ER 2,000 mg daily but not very consistent. Never tried Ozempic because decided she did not want any additional medication right now.   Diet: Met with dietician a long time ago For breakfast: frozen breakfast burrito For lunch: snacks while at college- nuts For dinner: some vegetable intake, protein and starch For snacks: less Drinks: water  No exercise. Doing more walking. She is completed her freshman year at [employer] and transferred to [employer] and restarted classes 2 weeks ago.  Also works at a local [employer] 3 days per week.   In regard to hypothyroidism diagnosed in 2021, taking LT4 50 mcg daily but noncompliant in the last 2 weeks. No new complaints.   In regard to hyperlipidemia, not taking Omega 3 fish oil 2 daily.

## 2024-02-07 NOTE — PHYSICAL EXAM
[Alert] : alert [Obese] : obese [No Acute Distress] : no acute distress [Normal Sclera/Conjunctiva] : normal sclera/conjunctiva [EOMI] : extra ocular movement intact [No LAD] : no lymphadenopathy [Thyroid Not Enlarged] : the thyroid was not enlarged [No Thyroid Nodules] : no palpable thyroid nodules [No Respiratory Distress] : no respiratory distress [Clear to Auscultation] : lungs were clear to auscultation bilaterally [Normal S1, S2] : normal S1 and S2 [Regular Rhythm] : with a regular rhythm [No Edema] : no peripheral edema [Normal Bowel Sounds] : normal bowel sounds [Not Tender] : non-tender [Not Distended] : not distended [Soft] : abdomen soft [Normal Anterior Cervical Nodes] : no anterior cervical lymphadenopathy [No Clubbing, Cyanosis] : no clubbing  or cyanosis of the fingernails [Abdominal Striae] : abdominal striae present [Acanthosis Nigricans] : acanthosis nigricans present [Hirsutism] : hirsutism present [Normal Reflexes] : deep tendon reflexes were 2+ and symmetric [Normal Mood] : the mood was normal [Kyphosis] : no kyphosis present [Acne] : no acne [de-identified] : dorsocervical fat pad noted [de-identified] : flat affect

## 2024-06-12 ENCOUNTER — NON-APPOINTMENT (OUTPATIENT)
Age: 24
End: 2024-06-12

## 2024-06-22 ENCOUNTER — NON-APPOINTMENT (OUTPATIENT)
Age: 24
End: 2024-06-22

## 2024-06-25 ENCOUNTER — APPOINTMENT (OUTPATIENT)
Dept: FAMILY MEDICINE | Facility: CLINIC | Age: 24
End: 2024-06-25
Payer: COMMERCIAL

## 2024-06-25 VITALS
WEIGHT: 282 LBS | SYSTOLIC BLOOD PRESSURE: 130 MMHG | BODY MASS INDEX: 45.32 KG/M2 | TEMPERATURE: 98.4 F | DIASTOLIC BLOOD PRESSURE: 80 MMHG | OXYGEN SATURATION: 97 % | HEART RATE: 101 BPM | HEIGHT: 66 IN | RESPIRATION RATE: 16 BRPM

## 2024-06-25 DIAGNOSIS — H92.02 OTALGIA, LEFT EAR: ICD-10-CM

## 2024-06-25 DIAGNOSIS — H61.892 OTHER SPECIFIED DISORDERS OF LEFT EXTERNAL EAR: ICD-10-CM

## 2024-06-25 PROCEDURE — 99214 OFFICE O/P EST MOD 30 MIN: CPT

## 2024-06-26 ENCOUNTER — NON-APPOINTMENT (OUTPATIENT)
Age: 24
End: 2024-06-26

## 2024-06-26 ENCOUNTER — APPOINTMENT (OUTPATIENT)
Dept: OTOLARYNGOLOGY | Facility: CLINIC | Age: 24
End: 2024-06-26

## 2024-06-26 VITALS
DIASTOLIC BLOOD PRESSURE: 87 MMHG | WEIGHT: 283 LBS | HEIGHT: 66 IN | BODY MASS INDEX: 45.48 KG/M2 | HEART RATE: 76 BPM | SYSTOLIC BLOOD PRESSURE: 128 MMHG

## 2024-06-26 PROCEDURE — 69210 REMOVE IMPACTED EAR WAX UNI: CPT

## 2024-06-26 PROCEDURE — 99203 OFFICE O/P NEW LOW 30 MIN: CPT | Mod: 25

## 2024-06-28 ENCOUNTER — APPOINTMENT (OUTPATIENT)
Dept: OTOLARYNGOLOGY | Facility: CLINIC | Age: 24
End: 2024-06-28
Payer: COMMERCIAL

## 2024-06-28 VITALS
SYSTOLIC BLOOD PRESSURE: 123 MMHG | BODY MASS INDEX: 45.48 KG/M2 | WEIGHT: 283 LBS | HEART RATE: 75 BPM | HEIGHT: 66 IN | DIASTOLIC BLOOD PRESSURE: 84 MMHG

## 2024-06-28 DIAGNOSIS — H60.90 UNSPECIFIED OTITIS EXTERNA, UNSPECIFIED EAR: ICD-10-CM

## 2024-06-28 DIAGNOSIS — H61.20 IMPACTED CERUMEN, UNSPECIFIED EAR: ICD-10-CM

## 2024-06-28 PROCEDURE — 69210 REMOVE IMPACTED EAR WAX UNI: CPT

## 2024-06-28 PROCEDURE — 99213 OFFICE O/P EST LOW 20 MIN: CPT | Mod: 25

## 2024-07-01 RX ORDER — OFLOXACIN OTIC 3 MG/ML
0.3 SOLUTION AURICULAR (OTIC)
Qty: 1 | Refills: 4 | Status: ACTIVE | COMMUNITY
Start: 2024-07-01 | End: 1900-01-01

## 2024-08-19 ENCOUNTER — APPOINTMENT (OUTPATIENT)
Dept: ENDOCRINOLOGY | Facility: CLINIC | Age: 24
End: 2024-08-19
Payer: COMMERCIAL

## 2024-08-19 VITALS
TEMPERATURE: 97.9 F | BODY MASS INDEX: 45.83 KG/M2 | HEART RATE: 89 BPM | SYSTOLIC BLOOD PRESSURE: 125 MMHG | HEIGHT: 66 IN | DIASTOLIC BLOOD PRESSURE: 85 MMHG | RESPIRATION RATE: 16 BRPM | WEIGHT: 285.19 LBS | OXYGEN SATURATION: 98 %

## 2024-08-19 DIAGNOSIS — E28.8 OTHER OVARIAN DYSFUNCTION: ICD-10-CM

## 2024-08-19 DIAGNOSIS — E16.1 OTHER HYPOGLYCEMIA: ICD-10-CM

## 2024-08-19 DIAGNOSIS — E28.2 POLYCYSTIC OVARIAN SYNDROME: ICD-10-CM

## 2024-08-19 DIAGNOSIS — E78.5 HYPERLIPIDEMIA, UNSPECIFIED: ICD-10-CM

## 2024-08-19 DIAGNOSIS — E66.01 MORBID (SEVERE) OBESITY DUE TO EXCESS CALORIES: ICD-10-CM

## 2024-08-19 DIAGNOSIS — E03.9 HYPOTHYROIDISM, UNSPECIFIED: ICD-10-CM

## 2024-08-19 PROCEDURE — 99215 OFFICE O/P EST HI 40 MIN: CPT

## 2024-08-20 NOTE — HISTORY OF PRESENT ILLNESS
[FreeTextEntry1] : 24 y.o. female with h/o PCOS diagnosed in 2016, obesity and hypothyroidism here for follow up visit.    She is looking for new therapist and pyschiatrist.   Originally saw pediatric endocrinology in February 2016 for primary amenorrhea. Laboratory studies showed normal adrenal androgens and high testosterone with low SHBG consistent with PCOS. Patient was treated with courses of Provera and attempted lifestyle changes. Patient was not able to lose weight and started Metformin in February 2017. In October 2019, Metformin was increased to 1,000 mg BID. There was a discussion on weight loss agents such as Saxenda and Orlistat and also bariatric surgery. Patient has declined weight loss medications and bariatric surgery. Patient also was seen by pediatric GI for fatty liver disease. Had fibroscan with significant steatosis but no fibrosis.   Not sexually active and has never seen GYN. Does report h/o acne but now stable. Did see dermatology in the past but not recently. Reports increased facial hair more so under the chin but stable. Reports shaving every 2 weeks. Reports more rapid weight gain in her middle school years. She reports weight gain of 20 pounds since May 2020; however, she reports weight is fairly stable since her last visit.  Has been using Provera every 3 months or so and satisfied with this. Not interested in OCPs because of compliance issue. Taking Metformin ER 2,000 mg daily but not very consistent. Never tried Ozempic because decided she did not want any additional medication right now.   Diet: Met with dietician a long time ago For breakfast: skips For lunch: frozen burritos or pepperoni For dinner: some vegetable intake, protein and starch For snacks: less Drinks: water and sometimes soda  No exercise. Doing more walking. She is completed her freshman year at [employer] and transferred to [employer].  Also works at a local [employer] 3 days per week.   In regard to hypothyroidism diagnosed in 2021, taking LT4 50 mcg daily. No new complaints.   In regard to hyperlipidemia, not taking Omega 3 fish oil 2 daily.

## 2024-08-20 NOTE — ASSESSMENT
[FreeTextEntry1] : 24 y.o. female with h/o PCOS, hyperandrogenism, hyperinsulinism, obesity, hypothyroidism and hyperlipidemia.  1. PCOS with hyperinsulinism- Discussed pathophysiology and concept of hyperinsulinism. Reviewed increased risk of developing Type 2 DM. Normal CMP and Hba1c in August 2024. Encouraged lifestyle modification including a carbohydrate consistent diet and exercise. Recommend taking Metformin ER 2,000 mg daily for better compliance and recommend taking it daily. Recommend GYN evaluation now that patient is > 18 years old. Discussed options of OCPs and IUD. For now, will continue course of Provera every 3 months or so. Stable testosterone and DHEAS levels.   2. Hyperandrogenism with hirsutism- Discussed treatment options including Spironolactone and/or OCPs in the past. Patient prefers to monitor for now and does not feel additional treatment is warranted at this time.  3. Obesity- Screen for Cushings was negative given normal 24-hour urine for cortisol. For now, encouraged diet changes with portion control and exercise. Discussed options of GLP-1 Callum such as Wegovy. Reviewed risks and benefits. Also discussed bariatric surgery. Patient prefers to work on lifestyle changes. Will refer to medical weight management program. Discussed the increased risk of developing HTN, Type 2 DM and FOOTE associated with obesity. Normal QUz2ovs 5.5% in August 2024.  4. Hypothyroidism- Discussed pathophysiology of subclinical hypothyroidism. Given patient's young age, would recommend keeping TSH < 3.0 to 4.0. Will continue Levothyroxine 50 mcg daily and stressed compliance. Discussed proper intake of T4.  5. Hyperlipidemia- Encouraged a low-fat diet and exercise.    Follow up in 6 months.

## 2024-08-20 NOTE — PHYSICAL EXAM
[Alert] : alert [Obese] : obese [No Acute Distress] : no acute distress [Normal Sclera/Conjunctiva] : normal sclera/conjunctiva [EOMI] : extra ocular movement intact [No LAD] : no lymphadenopathy [Thyroid Not Enlarged] : the thyroid was not enlarged [No Thyroid Nodules] : no palpable thyroid nodules [No Respiratory Distress] : no respiratory distress [Clear to Auscultation] : lungs were clear to auscultation bilaterally [Normal S1, S2] : normal S1 and S2 [Regular Rhythm] : with a regular rhythm [No Edema] : no peripheral edema [Normal Bowel Sounds] : normal bowel sounds [Not Tender] : non-tender [Not Distended] : not distended [Soft] : abdomen soft [Normal Anterior Cervical Nodes] : no anterior cervical lymphadenopathy [No Clubbing, Cyanosis] : no clubbing  or cyanosis of the fingernails [Abdominal Striae] : abdominal striae present [Acanthosis Nigricans] : acanthosis nigricans present [Hirsutism] : hirsutism present [Normal Reflexes] : deep tendon reflexes were 2+ and symmetric [Normal Mood] : the mood was normal [Kyphosis] : no kyphosis present [Acne] : no acne [de-identified] : dorsocervical fat pad noted [de-identified] : flat affect

## 2024-08-20 NOTE — ASSESSMENT
[FreeTextEntry1] : 24 y.o. female with h/o PCOS, hyperandrogenism, hyperinsulinism, obesity, hypothyroidism and hyperlipidemia.  1. PCOS with hyperinsulinism- Discussed pathophysiology and concept of hyperinsulinism. Reviewed increased risk of developing Type 2 DM. Normal CMP and Hba1c in August 2024. Encouraged lifestyle modification including a carbohydrate consistent diet and exercise. Recommend taking Metformin ER 2,000 mg daily for better compliance and recommend taking it daily. Recommend GYN evaluation now that patient is > 18 years old. Discussed options of OCPs and IUD. For now, will continue course of Provera every 3 months or so. Stable testosterone and DHEAS levels.   2. Hyperandrogenism with hirsutism- Discussed treatment options including Spironolactone and/or OCPs in the past. Patient prefers to monitor for now and does not feel additional treatment is warranted at this time.  3. Obesity- Screen for Cushings was negative given normal 24-hour urine for cortisol. For now, encouraged diet changes with portion control and exercise. Discussed options of GLP-1 Callum such as Wegovy. Reviewed risks and benefits. Also discussed bariatric surgery. Patient prefers to work on lifestyle changes. Will refer to medical weight management program. Discussed the increased risk of developing HTN, Type 2 DM and FOOTE associated with obesity. Normal HXz2mno 5.5% in August 2024.  4. Hypothyroidism- Discussed pathophysiology of subclinical hypothyroidism. Given patient's young age, would recommend keeping TSH < 3.0 to 4.0. Will continue Levothyroxine 50 mcg daily and stressed compliance. Discussed proper intake of T4.  5. Hyperlipidemia- Encouraged a low-fat diet and exercise.    Follow up in 6 months.

## 2024-08-20 NOTE — PHYSICAL EXAM
[Alert] : alert [Obese] : obese [No Acute Distress] : no acute distress [Normal Sclera/Conjunctiva] : normal sclera/conjunctiva [EOMI] : extra ocular movement intact [No LAD] : no lymphadenopathy [Thyroid Not Enlarged] : the thyroid was not enlarged [No Thyroid Nodules] : no palpable thyroid nodules [No Respiratory Distress] : no respiratory distress [Clear to Auscultation] : lungs were clear to auscultation bilaterally [Normal S1, S2] : normal S1 and S2 [Regular Rhythm] : with a regular rhythm [No Edema] : no peripheral edema [Normal Bowel Sounds] : normal bowel sounds [Not Tender] : non-tender [Not Distended] : not distended [Soft] : abdomen soft [Normal Anterior Cervical Nodes] : no anterior cervical lymphadenopathy [No Clubbing, Cyanosis] : no clubbing  or cyanosis of the fingernails [Abdominal Striae] : abdominal striae present [Acanthosis Nigricans] : acanthosis nigricans present [Hirsutism] : hirsutism present [Normal Reflexes] : deep tendon reflexes were 2+ and symmetric [Normal Mood] : the mood was normal [Kyphosis] : no kyphosis present [Acne] : no acne [de-identified] : dorsocervical fat pad noted [de-identified] : flat affect

## 2024-08-20 NOTE — DATA REVIEWED
[FreeTextEntry1] : Labs 12/2/2020 serum sodium 131 BUN/cr 5/0.7 calcium 9.3 AST 54 ALT 69 chol 204 tri 228 HDL 31  H/H 13.6/40.7 Hba1c 5.5% testo 67 SHBG 8 insulin level 67.1 HCG <1 DHEAS 125 24 urine for free cortisol 20.7 TSH 3.7   8/5/21 glucose 73 BUN/cr 5/0.66 calcium 9.4 AST 53 ALT 95 chol 226 tri 268 HDL 29  H/H 13.8/42.5 Hba1c 5.7% TSH 5.34 Free T4 0.97 testosterone 50.2 DHEAS 100 insulin level 69.3  1/15/22 BUN/cr 13/.65 calcium 9.7 glucose 84 AST 30 ALT 63 chol 184 HDL 32  tri 218 WBC 11.10 Hba1c 5.6% H/H 13.2/39.7 TSH 2.9 Free T4 1.01 testosterone 32.1 Tg ab < 20 DHEA 2.6 insulin > 300   8/16/22 ALT 49 glucose 74 BUN/cr 9/0.56 chol 170 tri 225 HDL 30 LDL 93 H/H 12.8/38.2 Hba1c 5.6% TSH 2.53 Free T4 0.9 LH 3.0 FSH 1.0 prolactin 20.1 estradiol 42 folic acid 3.64 vitamin B12 319 IGF-1 144 testosterone 68  1/31/24 glucose 77 BUN/cr 12/.78 calcium 9.3 AST 29 ALT 44 chol 190 HDL 28  tri 199 H/H 13.6/41.0 Hba1c 5.6% TSH 4.82 Free T4 1.06 testosterone 49.8 folic acid 6.4 vitamin B12 495 DHEA 6.6 25 vitamin D 16.4  8/15/24 glucose 79 BUN/cr 10/.83 calcium 9.4 AST 23 ALT 34 chol 206 HDL 32  tri 226 H/H 13.5/41.5 Hba1c 5.5% TSH 4.07 free T4 1.09 testosterone 48.2 folic acid 4.8 vitamin B12 443 DHEAS 122

## 2024-08-20 NOTE — HISTORY OF PRESENT ILLNESS
[FreeTextEntry1] : 24 y.o. female with h/o PCOS diagnosed in 2016, obesity and hypothyroidism here for follow up visit.    She is looking for new therapist and pyschiatrist.   Originally saw pediatric endocrinology in February 2016 for primary amenorrhea. Laboratory studies showed normal adrenal androgens and high testosterone with low SHBG consistent with PCOS. Patient was treated with courses of Provera and attempted lifestyle changes. Patient was not able to lose weight and started Metformin in February 2017. In October 2019, Metformin was increased to 1,000 mg BID. There was a discussion on weight loss agents such as Saxenda and Orlistat and also bariatric surgery. Patient has declined weight loss medications and bariatric surgery. Patient also was seen by pediatric GI for fatty liver disease. Had fibroscan with significant steatosis but no fibrosis.   Not sexually active and has never seen GYN. Does report h/o acne but now stable. Did see dermatology in the past but not recently. Reports increased facial hair more so under the chin but stable. Reports shaving every 2 weeks. Reports more rapid weight gain in her middle school years. She reports weight gain of 20 pounds since May 2020; however, she reports weight is fairly stable since her last visit.  Has been using Provera every 3 months or so and satisfied with this. Not interested in OCPs because of compliance issue. Taking Metformin ER 2,000 mg daily but not very consistent. Never tried Ozempic because decided she did not want any additional medication right now.   Diet: Met with dietician a long time ago For breakfast: skips For lunch: frozen burritos or pepperoni For dinner: some vegetable intake, protein and starch For snacks: less Drinks: water and sometimes soda  No exercise. Doing more walking. She is completed her freshman year at [employer] and transferred to [id number].  Also works at a local [employer] 3 days per week.   In regard to hypothyroidism diagnosed in 2021, taking LT4 50 mcg daily. No new complaints.   In regard to hyperlipidemia, not taking Omega 3 fish oil 2 daily.

## 2025-02-12 ENCOUNTER — APPOINTMENT (OUTPATIENT)
Dept: ENDOCRINOLOGY | Facility: CLINIC | Age: 25
End: 2025-02-12
Payer: MEDICAID

## 2025-02-12 VITALS
BODY MASS INDEX: 46.77 KG/M2 | SYSTOLIC BLOOD PRESSURE: 133 MMHG | DIASTOLIC BLOOD PRESSURE: 83 MMHG | HEART RATE: 70 BPM | OXYGEN SATURATION: 99 % | WEIGHT: 291 LBS | HEIGHT: 66 IN

## 2025-02-12 VITALS — SYSTOLIC BLOOD PRESSURE: 120 MMHG | DIASTOLIC BLOOD PRESSURE: 78 MMHG

## 2025-02-12 DIAGNOSIS — E28.2 POLYCYSTIC OVARIAN SYNDROME: ICD-10-CM

## 2025-02-12 DIAGNOSIS — E16.1 OTHER HYPOGLYCEMIA: ICD-10-CM

## 2025-02-12 DIAGNOSIS — E66.01 MORBID (SEVERE) OBESITY DUE TO EXCESS CALORIES: ICD-10-CM

## 2025-02-12 DIAGNOSIS — E28.8 OTHER OVARIAN DYSFUNCTION: ICD-10-CM

## 2025-02-12 DIAGNOSIS — E03.9 HYPOTHYROIDISM, UNSPECIFIED: ICD-10-CM

## 2025-02-12 DIAGNOSIS — E78.5 HYPERLIPIDEMIA, UNSPECIFIED: ICD-10-CM

## 2025-02-12 PROCEDURE — 99214 OFFICE O/P EST MOD 30 MIN: CPT

## 2025-08-07 ENCOUNTER — APPOINTMENT (OUTPATIENT)
Dept: FAMILY MEDICINE | Facility: CLINIC | Age: 25
End: 2025-08-07
Payer: MEDICAID

## 2025-08-07 VITALS
WEIGHT: 293 LBS | HEIGHT: 66 IN | SYSTOLIC BLOOD PRESSURE: 130 MMHG | TEMPERATURE: 98.3 F | OXYGEN SATURATION: 97 % | BODY MASS INDEX: 47.09 KG/M2 | RESPIRATION RATE: 16 BRPM | HEART RATE: 131 BPM | DIASTOLIC BLOOD PRESSURE: 80 MMHG

## 2025-08-07 DIAGNOSIS — H61.20 IMPACTED CERUMEN, UNSPECIFIED EAR: ICD-10-CM

## 2025-08-07 DIAGNOSIS — E03.9 HYPOTHYROIDISM, UNSPECIFIED: ICD-10-CM

## 2025-08-07 DIAGNOSIS — R73.03 PREDIABETES.: ICD-10-CM

## 2025-08-07 DIAGNOSIS — E66.01 MORBID (SEVERE) OBESITY DUE TO EXCESS CALORIES: ICD-10-CM

## 2025-08-07 DIAGNOSIS — F32.A ANXIETY DISORDER, UNSPECIFIED: ICD-10-CM

## 2025-08-07 DIAGNOSIS — Z00.00 ENCOUNTER FOR GENERAL ADULT MEDICAL EXAMINATION W/OUT ABNORMAL FINDINGS: ICD-10-CM

## 2025-08-07 DIAGNOSIS — E78.5 HYPERLIPIDEMIA, UNSPECIFIED: ICD-10-CM

## 2025-08-07 DIAGNOSIS — E28.2 POLYCYSTIC OVARIAN SYNDROME: ICD-10-CM

## 2025-08-07 DIAGNOSIS — E55.9 VITAMIN D DEFICIENCY, UNSPECIFIED: ICD-10-CM

## 2025-08-07 DIAGNOSIS — K76.0 FATTY (CHANGE OF) LIVER, NOT ELSEWHERE CLASSIFIED: ICD-10-CM

## 2025-08-07 DIAGNOSIS — F41.9 ANXIETY DISORDER, UNSPECIFIED: ICD-10-CM

## 2025-08-07 PROCEDURE — 99395 PREV VISIT EST AGE 18-39: CPT

## 2025-08-07 PROCEDURE — G0447 BEHAVIOR COUNSEL OBESITY 15M: CPT | Mod: 59

## 2025-08-07 RX ORDER — ERGOCALCIFEROL 1.25 MG/1
1.25 MG CAPSULE, LIQUID FILLED ORAL
Qty: 12 | Refills: 1 | Status: ACTIVE | COMMUNITY
Start: 2025-08-07 | End: 1900-01-01

## 2025-08-20 ENCOUNTER — APPOINTMENT (OUTPATIENT)
Dept: ENDOCRINOLOGY | Facility: CLINIC | Age: 25
End: 2025-08-20
Payer: MEDICAID

## 2025-08-20 VITALS
BODY MASS INDEX: 47.09 KG/M2 | OXYGEN SATURATION: 100 % | HEART RATE: 82 BPM | SYSTOLIC BLOOD PRESSURE: 134 MMHG | HEIGHT: 66 IN | DIASTOLIC BLOOD PRESSURE: 82 MMHG | RESPIRATION RATE: 16 BRPM | WEIGHT: 293 LBS

## 2025-08-20 DIAGNOSIS — E28.8 OTHER OVARIAN DYSFUNCTION: ICD-10-CM

## 2025-08-20 DIAGNOSIS — E78.1 PURE HYPERGLYCERIDEMIA: ICD-10-CM

## 2025-08-20 DIAGNOSIS — R73.03 PREDIABETES.: ICD-10-CM

## 2025-08-20 DIAGNOSIS — E66.01 MORBID (SEVERE) OBESITY DUE TO EXCESS CALORIES: ICD-10-CM

## 2025-08-20 DIAGNOSIS — E03.9 HYPOTHYROIDISM, UNSPECIFIED: ICD-10-CM

## 2025-08-20 DIAGNOSIS — E28.2 POLYCYSTIC OVARIAN SYNDROME: ICD-10-CM

## 2025-08-20 PROCEDURE — G2211 COMPLEX E/M VISIT ADD ON: CPT | Mod: NC

## 2025-08-20 PROCEDURE — 99215 OFFICE O/P EST HI 40 MIN: CPT

## 2025-08-20 RX ORDER — OMEGA-3-ACID ETHYL ESTERS CAPSULES 1 G/1
1 CAPSULE, LIQUID FILLED ORAL
Qty: 60 | Refills: 5 | Status: ACTIVE | COMMUNITY
Start: 2025-08-20 | End: 1900-01-01

## 2025-09-02 ENCOUNTER — APPOINTMENT (OUTPATIENT)
Dept: FAMILY MEDICINE | Facility: CLINIC | Age: 25
End: 2025-09-02
Payer: MEDICAID

## 2025-09-02 VITALS
OXYGEN SATURATION: 97 % | BODY MASS INDEX: 47.09 KG/M2 | SYSTOLIC BLOOD PRESSURE: 120 MMHG | HEART RATE: 143 BPM | WEIGHT: 293 LBS | DIASTOLIC BLOOD PRESSURE: 74 MMHG | HEIGHT: 66 IN | RESPIRATION RATE: 16 BRPM | TEMPERATURE: 98.3 F

## 2025-09-02 DIAGNOSIS — F41.9 ANXIETY DISORDER, UNSPECIFIED: ICD-10-CM

## 2025-09-02 DIAGNOSIS — K76.0 FATTY (CHANGE OF) LIVER, NOT ELSEWHERE CLASSIFIED: ICD-10-CM

## 2025-09-02 DIAGNOSIS — F32.A ANXIETY DISORDER, UNSPECIFIED: ICD-10-CM

## 2025-09-02 DIAGNOSIS — E66.01 MORBID (SEVERE) OBESITY DUE TO EXCESS CALORIES: ICD-10-CM

## 2025-09-02 PROCEDURE — G2211 COMPLEX E/M VISIT ADD ON: CPT | Mod: NC

## 2025-09-02 PROCEDURE — 99215 OFFICE O/P EST HI 40 MIN: CPT

## 2025-09-02 PROCEDURE — G0447 BEHAVIOR COUNSEL OBESITY 15M: CPT | Mod: 59

## 2025-09-02 RX ORDER — BUPROPION HYDROCHLORIDE 75 MG/1
75 TABLET, FILM COATED ORAL
Qty: 30 | Refills: 5 | Status: ACTIVE | COMMUNITY
Start: 2025-09-02 | End: 1900-01-01

## 2025-09-10 ENCOUNTER — APPOINTMENT (OUTPATIENT)
Dept: PSYCHIATRY | Facility: TELEHEALTH | Age: 25
End: 2025-09-10
Payer: MEDICAID

## 2025-09-10 ENCOUNTER — TRANSCRIPTION ENCOUNTER (OUTPATIENT)
Age: 25
End: 2025-09-10

## 2025-09-10 PROCEDURE — 90791 PSYCH DIAGNOSTIC EVALUATION: CPT | Mod: 95

## 2025-09-17 ENCOUNTER — TRANSCRIPTION ENCOUNTER (OUTPATIENT)
Age: 25
End: 2025-09-17